# Patient Record
Sex: MALE | Race: ASIAN | NOT HISPANIC OR LATINO | URBAN - METROPOLITAN AREA
[De-identification: names, ages, dates, MRNs, and addresses within clinical notes are randomized per-mention and may not be internally consistent; named-entity substitution may affect disease eponyms.]

---

## 2023-09-14 ENCOUNTER — LAB (OUTPATIENT)
Dept: LAB | Facility: LAB | Age: 28
End: 2023-09-14
Payer: COMMERCIAL

## 2023-09-14 LAB
HEPATITIS C VIRUS AB PRESENCE IN SERUM: NONREACTIVE
HIV 1/ 2 AG/AB SCREEN: NONREACTIVE
SYPHILIS TOTAL AB: NONREACTIVE

## 2023-09-15 LAB
CHLAMYDIA TRACH., AMPLIFIED: NEGATIVE
CHLAMYDIA TRACH., AMPLIFIED: NEGATIVE
N. GONORRHEA, AMPLIFIED: NEGATIVE
N. GONORRHEA, AMPLIFIED: NEGATIVE

## 2023-11-27 PROCEDURE — 87070 CULTURE OTHR SPECIMN AEROBIC: CPT

## 2023-11-27 PROCEDURE — 87186 SC STD MICRODIL/AGAR DIL: CPT

## 2023-11-27 PROCEDURE — 87075 CULTR BACTERIA EXCEPT BLOOD: CPT

## 2023-11-29 ENCOUNTER — LAB REQUISITION (OUTPATIENT)
Dept: LAB | Facility: HOSPITAL | Age: 28
End: 2023-11-29
Payer: COMMERCIAL

## 2023-11-29 DIAGNOSIS — N48.1 BALANITIS: ICD-10-CM

## 2023-12-01 LAB
BACTERIA SPEC CULT: ABNORMAL
BACTERIA SPEC CULT: ABNORMAL
GRAM STN SPEC: ABNORMAL
GRAM STN SPEC: ABNORMAL

## 2024-03-11 NOTE — PROGRESS NOTES
Patient: Chris Francisco    [id number]  : 1995 -- AGE 28 y.o.    Provider: Perri Sharp MD PhD     Location [facility]   Service Date: 3/12/2024              [facility]  Followup Visit Note      HISTORY OF PRESENT ILLNESS     The patient's referring provider is:  Yvette Grijalva MD     HISTORY OF PRESENT ILLNESS   Chris Francisco is a 28 y.o. male with PMHx significant for THIERRY, deviated nasal septum with nasal airway obstruction R>L, nasal congestion, obesity class 2, GERD, gout, depression, who presents to a [facility] for followup.     PAST SLEEP HISTORY  Patient has had past sleep testing in 2022 which showed severe THIERRY with an AHI3% of 66/h. He was started on APAP.     CURRENT HISTORY  On today's visit, the patient reports he is using CPAP every night. On CPAP he does not snore, gasp, or choke. A few nights/month he has issues breathing with CPAP.  He senses his nares collapsing and feels nasal dilators helps. He is using nasal mask right now. He is using OTC fluticasone.    Overall feels more refreshed on CPAP than without it.    Sleep schedule:    In bed: 9PM  Activities in bed:  Bedtime Activities: turns out the lights  Subjective sleep latency:  15min  Awakenings during night: 0-1x  Length of awakenings: < 30 min  Final awakening time: 4:45AM  Out of bed: 4:45AM  Overall estimate of total sleep time: 6-8h    Weekends: similar  Preferred sleeping position: supine and sidelying  Typically sleeps alone.       PAP Adherence:  DURABLE MEDICAL EQUIPMENT COMPANY: MEDICAL [employer] COMPANY  Machine: THERAPY: RESMED AIRSENSE 11 PRESSURE SETTINGS: 5 - 15 cm H2O  Mask: MASK TYPE: NASAL MASK   Issues with therapy: ISSUES WITH THERAPY: dryness/congestion  Benefits with PAP: PERCEIVED BENEFITS OF PAP: refreshing sleep reduced daytime sleepiness      Daytime Symptoms  On awakening patient reports: waking refreshed    Daytime: During  the day, he does not doze unintentionally while inactive.  During more active tasks, he rarely is drowsy.  With regards to daytime napping, the patient reports rarely taking naps. If he takes a nap, typically he awakens refreshed.  He does not report that poor sleep results in mood-related irritability. He does not report that poor sleep results in issues with memory/concentration.    ESS: 2  JOSE: 8  FOSQ: 38    REVIEW OF SYSTEMS     REVIEW OF SYSTEMS  Review of Systems   All other systems reviewed and are negative.    ALLERGIES AND MEDICATIONS     ALLERGIES  No Known Allergies    MEDICATIONS: He has a current medication list which includes the following prescription(s): allopurinol - Take 1 tablet (300 mg) by mouth once daily and bupropion xl - Take 1 tablet (300 mg) by mouth once daily.    PAST MEDICAL HISTORY : He does not have a problem list on file.    PAST SURGICAL HISTORY: He  has no past surgical history on file.     FAMILY HISTORY: Mom has RLS, father has bruxism No changes since previous visit. Otherwise non-contributory as charted.     SOCIAL HISTORY  He  reports that he quit smoking about 5 years ago. His smoking use included cigarettes. He does not have any smokeless tobacco history on file. He reports current alcohol use. He reports that he does not currently use drugs. Student at [employer].       PHYSICAL EXAM     Physical Examination: /80   Pulse 87   Temp 36.6 °C (97.9 °F)   Wt 129 kg (284 lb)   SpO2 98%   BMI 37.47 kg/m²     PREVIOUS WEIGHTS:  Wt Readings from Last 3 Encounters:   03/12/24 129 kg (284 lb)   11/22/22 122 kg (268 lb 7 oz)   10/18/22 123 kg (270 lb 6.4 oz)       General: The patient is a pleasant male, in no acute distress. HEENT: He has a  a modified Mallampati grade 2 airway with Numbers; 0-4 (with +): 3+ tonsils bilaterally. The soft palate was  arched  and the uvula was normal. The A/P diameter of the velopharynx was not narrowed. The oropharynx was not shallow in the A/P  "diameter. Lateral wall narrowing was not present. Tongue ridging was notpresent. Erythema of the posterior pharynx was not present. Mucosal hypertrophy in the posterior oropharynx was not present. Retrognathia was not and micrognathia was not present. Neck: The neck was not enlarged. No JVP, bruits or lymphadenopathy was appreciated. Chest: Clear to auscultation. No wheezes, rales, or rhonchi. Cardiovascular: Regular rate and rhythm. No murmurs, gallops, or clicks. Abdomen: Soft, nontender, nondistended. Positive bowel sounds. Extremities: No clubbing, cyanosis, or edema is noted. Neurologic exam: Alert, oriented x3 and was grossly non-focal.      RESULTS/DATA     No results found for: \"IRON\", \"TRANSFERRIN\", \"IRONSAT\", \"TIBC\", \"FERRITIN\"    Bicarbonate (mmol/L)   Date Value   01/24/2022 26   02/18/2021 28   09/03/2019 30       PAP Adherence  A PAP adherence download was obtained and data was reviewed personally today in clinic. (see scanned document in EPIC)      DIAGNOSES     Problem List and Orders  Diagnoses and all orders for this visit:  THIERRY (obstructive sleep apnea)  -     Positive Airway Pressure (PAP) Therapy        ASSESSMENT/PLAN     Mr. Francisco is a 28 y.o. male and with PMHx significant for THIERRY, deviated nasal septum with nasal airway obstruction R>L, nasal congestion, obesity class 2, GERD, gout, depression. He returns in followup to  the [facility] for their sleep apnea.      THIERRY on PAP: Overall, Mr. Francisco is doing well with the use of PAP therapy for his THIERRY. Compared to before starting PAP, he continues to use and benefit from use of the PAP device and has observed improvements in sleep quality and daytime function. Specifically, his residual daytime sleepiness or fatigue has significantly improved. Thus, continued use of PAP was recommended and to use for the entire sleep period. On PAP, he doesn't snore, have witnessed apneas, gasp for air, or kick/thrash. Objective " "compliance data also suggest excellent efficacy in the home setting. At a minimum he should use PAP for at least 4 hours/night, however, \"all night, every night\" is best. In addition, PAP use during naps are also encouraged. Mr. Francisco was advised to contact him DME to obtain replenishment supplies for him PAP every 3 months or as needed or for other equipment specific issues.      Nasal congestion. Patient with some congestion issues with using PAP despite flonase. Recommend increasing humidification. Also could try nasal washes. Will also have him trial nasal pillows to see if stenting the airway is helpful for him. If not improvement suggest seeing ENT for additional suggestions.    Obesity.  The patient was counseled that his weight is the strongest modifiable risk factor and contributor for THIERRY. He was counseled to consider weight loss options to include changes in dietary habits and activity. We briefly discussed the use of calorie tracking smartphone apps and the use of activity meters to provide feedback that helps in losing weight.  Before initiating an exercise plan, he should carefully review the approach with his primary care provider.           Follow up: 1 year         Perri Sharp MD PhD     "

## 2024-03-12 ENCOUNTER — OFFICE VISIT (OUTPATIENT)
Dept: SLEEP MEDICINE | Facility: HOSPITAL | Age: 29
End: 2024-03-12
Payer: COMMERCIAL

## 2024-03-12 VITALS
TEMPERATURE: 97.9 F | HEART RATE: 87 BPM | WEIGHT: 284 LBS | SYSTOLIC BLOOD PRESSURE: 123 MMHG | BODY MASS INDEX: 37.47 KG/M2 | OXYGEN SATURATION: 98 % | DIASTOLIC BLOOD PRESSURE: 80 MMHG

## 2024-03-12 DIAGNOSIS — G47.33 OSA (OBSTRUCTIVE SLEEP APNEA): Primary | ICD-10-CM

## 2024-03-12 DIAGNOSIS — E66.01 CLASS 2 SEVERE OBESITY DUE TO EXCESS CALORIES WITH SERIOUS COMORBIDITY AND BODY MASS INDEX (BMI) OF 37.0 TO 37.9 IN ADULT (MULTI): ICD-10-CM

## 2024-03-12 PROCEDURE — 99214 OFFICE O/P EST MOD 30 MIN: CPT | Performed by: INTERNAL MEDICINE

## 2024-03-12 RX ORDER — BUPROPION HYDROCHLORIDE 300 MG/1
1 TABLET ORAL DAILY
COMMUNITY
Start: 2022-02-16

## 2024-03-12 RX ORDER — ALLOPURINOL 300 MG/1
1 TABLET ORAL DAILY
COMMUNITY
Start: 2018-07-01

## 2024-03-12 SDOH — ECONOMIC STABILITY: FOOD INSECURITY: WITHIN THE PAST 12 MONTHS, YOU WORRIED THAT YOUR FOOD WOULD RUN OUT BEFORE YOU GOT MONEY TO BUY MORE.: NEVER TRUE

## 2024-03-12 SDOH — ECONOMIC STABILITY: FOOD INSECURITY: WITHIN THE PAST 12 MONTHS, THE FOOD YOU BOUGHT JUST DIDN'T LAST AND YOU DIDN'T HAVE MONEY TO GET MORE.: NEVER TRUE

## 2024-03-12 ASSESSMENT — PATIENT HEALTH QUESTIONNAIRE - PHQ9
SUM OF ALL RESPONSES TO PHQ9 QUESTIONS 1 & 2: 0
2. FEELING DOWN, DEPRESSED OR HOPELESS: NOT AT ALL
1. LITTLE INTEREST OR PLEASURE IN DOING THINGS: NOT AT ALL

## 2024-03-12 ASSESSMENT — LIFESTYLE VARIABLES
HOW OFTEN DO YOU HAVE A DRINK CONTAINING ALCOHOL: MONTHLY OR LESS
HOW OFTEN DO YOU HAVE SIX OR MORE DRINKS ON ONE OCCASION: NEVER

## 2024-03-12 ASSESSMENT — PAIN SCALES - GENERAL: PAINLEVEL: 0-NO PAIN

## 2024-03-12 NOTE — PATIENT INSTRUCTIONS
[facility] Sleep Medicine  [facility] [place]  [address]  [facility] [address]  [phone]    [facility] [place]  [address]  [facility] [address]  [phone]  [facility] [place]  [address]  [facility]  [facility] [address]           NAME: Chris Francisco   DATE: 3/12/2024     Your Sleep Provider Today: Perri Sharp MD PhD  Your Primary Care Physician: Yvette Grijalva MD   Your Referring Provider: No ref. provider found    Thank you for coming to the Sleep Medicine Clinic today! Your sleep medicine provider today was: Perri Sharp MD PhD Below is a summary of your treatment plan, other important information, and our contact numbers:  If you need to schedule an appointment, please call [phone]  If you need general assistance (e.g. forms completed, general questions), please call my , Valerie, at [phone].  If you have a medical question about your sleep issues, please contact our nurses, Cherise or Effie at [phone].   You can also contact us through [employer].      DIAGNOSIS:   1. THIERRY (obstructive sleep apnea)  Positive Airway Pressure (PAP) Therapy              TREATMENT PLAN     Try increasing the humidifcation to see if this improves your nasal breathing  We will have you also try the nasal pillows  If this does not help, try nasal washes and if that doesn't can add azelastine  If not improving then schedule folowup with Dr. Huerta    Instructions - Common THIERRY Recs: - For your sleep apnea, continue to use your PAP every night and use it whenever you are sleeping.   - Avoid alcohol or sedatives several hours prior to sleeping.   - Get additional supplies for your PAP (e.g., mask, hose, filters) every 3 months or as your insurance allows from your [employer] company. Replacement cushions for your PAP mask can be requested  monthly if airseals are an issue.  - Remember to clean your mask, tubings, and water chamber regularly as instructed.  - Avoid driving or operating heavy machinery when drowsy. A person driving while sleepy is five (5) times more likely to have an accident. If you feel sleepy, pull over and take a short power nap (sleep for less than 30 minutes). Otherwise, ask somebody to drive you.    Follow-up Appointment:   Followup with me in 12 months.      IMPORTANT INFORMATION     Call 911 for medical emergencies.  Our offices are generally open from Monday-Friday, 9 am - 5 pm.  If you need to get in touch with me, you may either call me and my team(number is below) or you can use [employer].  If a referral for a test, for CPAP, or for another specialist was made, and you have not heard about scheduling this within a week, please call scheduling at [phone] ([phone]).  If you are unable to make your appointment for clinic or an overnight study, kindly call the office at least 48 hours in advance to cancel and reschedule.  If you are on CPAP, please bring your device's card or the device to each clinic appointment.   There are no supporting services by either the sleep doctors or their staff on weekends and Holidays, or after 5 PM on weekdays.   If you have been asked to come to a sleep study, make sure you bring toiletries, a comfy pillow, and any nighttime medications that you may regularly take. Also be sure to eat dinner before you arrive. We generally do not provide meals.      PRESCRIPTIONS     We require 7 days advanced notice for prescription refills. If we do not receive the request in this time, we cannot guarantee that your medication will be refilled in time.      IMPORTANT PHONE NUMBERS      scheduling for medical [phone]   Sleep Medicine Clinic Fax: [phone]  Appointments (for Pediatric Sleep Clinic): [phone] - option 1  Appointments (for Adult Sleep Clinic):  [phone] - option 2  Appointments (For Sleep Studies): [phone] - option 3  Behavioral Sleep Medicine: [phone]  Bariatric Surgery: [phone] ( Bariatric Surgery Website)   Sleep Surgery: [phone]  ENT (Otolaryngology): [phone]  Headache Clinic (Neurology): [phone]  Neurology: [phone]  Psychiatry: [phone]  Pulmonary Function Testing (PFT) Center: [phone]  Pulmonary Medicine: [phone]  [place] (DME): [phone]  [place] (DME): [phone]  [facility] (DME): [phone]      COMMON PROVIDERS WE REFER TO     For Weight Loss - Dr. Kathi Abarca - Call [phone]  For Sleep Surgery - Dr. Myriam Hernandez - Call [phone]      OUR ADULT SLEEP MEDICINE TEAM   Please do not hesitate to call the office or sleep nurse with any questions between appointments:    Adult Sleep Nurses (Effie Metz, RN and Cherise Carrion RN):  For clinical questions and refilling prescriptions: [phone]  Email sleep diaries and other documents at: [email or web address]    Adult Sleep Medicine Secretaries:  Tati Xie (For Cesar/Hong/Krise/Strohl/Yeh/Trejo):   P: [phone]  F: [phone]  Valerie Higgins (For Sharp/Guggenbiller): P: [phone]  Fax: [phone]  Argelia Weber (For Jurcevic/Blank): P: [phone]  F: [phone]  Mariella Solorzano (For Christopher): P: [phone]  F: [phone]  Subha Harley (For Pooja/Lidia/Zakhary): P: [phone]  F: [phone]  Lilliam Engel (For Rodgers/Callejas): P: [phone]  F: [phone]     Adult Sleep Medicine Advanced Practice Providers:  Obi Beltre ([place], [place])  Candelaria Murillo ([facility])  Sandra Grissom CNP ([place], [place], [facility])  Negar Goldman CNP ([place], [place], [facility])  Ileana White ([place], [place], [facility])  Tyesha Callejas CNP ([facility])        OUR SLEEP TESTING LOCATIONS  "    Our team will contact you to schedule your sleep study, however, you can contact us as follow:  Main Phone Line (scheduling only): [phone]), option 3  Adult and Pediatric Locations   [place] (6 years and older): [facility] - 4th floor ([address] [facility], [facility]) After hours line: [phone]  [facility] (Main campus: All ages): [facility], 6th floor. After hours line: [phone]   [place] (5 years and older; younger considered on case-by-case basis): [address]; [address], [address]. Scheduling  After hours line: [phone]   [place] (6 years and older): [address]; [address]; [address]   [place] (6 years and older): [address] [facility], [address]  After hours line: [phone]   [place] (13 years and older) in [place]: [address], 2nd floor  After hours line: [phone]   [place] (13 year and older): [address]  After hours line: [phone]     Adult Only Locations:   [place] (18 years and older): [address] [facility], 2nd floor   [place] (18 years and older): [address] [facility]; 4th floor  After hours line: [phone]   [place] (18 years and older) at [place]: [address] [place] [address]  After hours line: [phone]          CONTACTING YOUR SLEEP MEDICINE PROVIDER     Send a message directly to your provider through \"My Chart\", which is the email service through your  Records Account: [email or web address]   Call [phone] and leave a message. One of the administrative assistants will forward the message to your sleep medicine provider through \"My Chart\" and/or email.     Your sleep medicine provider for this visit was: Perri Sharp MD PhD        "

## 2024-08-12 ENCOUNTER — LAB REQUISITION (OUTPATIENT)
Dept: LAB | Facility: HOSPITAL | Age: 29
End: 2024-08-12
Payer: COMMERCIAL

## 2024-08-12 DIAGNOSIS — Z11.3 ENCOUNTER FOR SCREENING FOR INFECTIONS WITH A PREDOMINANTLY SEXUAL MODE OF TRANSMISSION: ICD-10-CM

## 2024-08-12 PROCEDURE — 87591 N.GONORRHOEAE DNA AMP PROB: CPT

## 2024-08-12 PROCEDURE — 87491 CHLMYD TRACH DNA AMP PROBE: CPT

## 2024-08-13 ENCOUNTER — LAB (OUTPATIENT)
Dept: LAB | Facility: LAB | Age: 29
End: 2024-08-13
Payer: COMMERCIAL

## 2024-08-13 DIAGNOSIS — Z11.3 ENCOUNTER FOR SCREENING FOR INFECTIONS WITH A PREDOMINANTLY SEXUAL MODE OF TRANSMISSION: Primary | ICD-10-CM

## 2024-08-13 LAB
ALBUMIN SERPL BCP-MCNC: 4.3 G/DL (ref 3.4–5)
ALP SERPL-CCNC: 54 U/L (ref 33–120)
ALT SERPL W P-5'-P-CCNC: 53 U/L (ref 10–52)
ANION GAP SERPL CALC-SCNC: 11 MMOL/L (ref 10–20)
AST SERPL W P-5'-P-CCNC: 22 U/L (ref 9–39)
BASOPHILS # BLD AUTO: 0.05 X10*3/UL (ref 0–0.1)
BASOPHILS NFR BLD AUTO: 0.5 %
BILIRUB SERPL-MCNC: 0.5 MG/DL (ref 0–1.2)
BUN SERPL-MCNC: 14 MG/DL (ref 6–23)
C TRACH RRNA SPEC QL NAA+PROBE: NEGATIVE
CALCIUM SERPL-MCNC: 9 MG/DL (ref 8.6–10.6)
CHLORIDE SERPL-SCNC: 101 MMOL/L (ref 98–107)
CHOLEST SERPL-MCNC: 183 MG/DL (ref 0–199)
CHOLESTEROL/HDL RATIO: 5.1
CO2 SERPL-SCNC: 31 MMOL/L (ref 21–32)
CREAT SERPL-MCNC: 1.02 MG/DL (ref 0.5–1.3)
EGFRCR SERPLBLD CKD-EPI 2021: >90 ML/MIN/1.73M*2
EOSINOPHIL # BLD AUTO: 0.2 X10*3/UL (ref 0–0.7)
EOSINOPHIL NFR BLD AUTO: 2.1 %
ERYTHROCYTE [DISTWIDTH] IN BLOOD BY AUTOMATED COUNT: 13 % (ref 11.5–14.5)
EST. AVERAGE GLUCOSE BLD GHB EST-MCNC: 114 MG/DL
GLUCOSE SERPL-MCNC: 102 MG/DL (ref 74–99)
HBA1C MFR BLD: 5.6 %
HCT VFR BLD AUTO: 43.2 % (ref 41–52)
HDLC SERPL-MCNC: 35.6 MG/DL
HGB BLD-MCNC: 14.1 G/DL (ref 13.5–17.5)
HIV 1+2 AB+HIV1 P24 AG SERPL QL IA: NONREACTIVE
IMM GRANULOCYTES # BLD AUTO: 0.04 X10*3/UL (ref 0–0.7)
IMM GRANULOCYTES NFR BLD AUTO: 0.4 % (ref 0–0.9)
LDLC SERPL CALC-MCNC: 94 MG/DL
LYMPHOCYTES # BLD AUTO: 3.28 X10*3/UL (ref 1.2–4.8)
LYMPHOCYTES NFR BLD AUTO: 34.7 %
MCH RBC QN AUTO: 28 PG (ref 26–34)
MCHC RBC AUTO-ENTMCNC: 32.6 G/DL (ref 32–36)
MCV RBC AUTO: 86 FL (ref 80–100)
MONOCYTES # BLD AUTO: 0.76 X10*3/UL (ref 0.1–1)
MONOCYTES NFR BLD AUTO: 8.1 %
N GONORRHOEA DNA SPEC QL PROBE+SIG AMP: NEGATIVE
NEUTROPHILS # BLD AUTO: 5.11 X10*3/UL (ref 1.2–7.7)
NEUTROPHILS NFR BLD AUTO: 54.2 %
NON HDL CHOLESTEROL: 147 MG/DL (ref 0–149)
NRBC BLD-RTO: 0 /100 WBCS (ref 0–0)
PLATELET # BLD AUTO: 350 X10*3/UL (ref 150–450)
POTASSIUM SERPL-SCNC: 4.1 MMOL/L (ref 3.5–5.3)
PROT SERPL-MCNC: 7.1 G/DL (ref 6.4–8.2)
RBC # BLD AUTO: 5.04 X10*6/UL (ref 4.5–5.9)
SODIUM SERPL-SCNC: 139 MMOL/L (ref 136–145)
TREPONEMA PALLIDUM IGG+IGM AB [PRESENCE] IN SERUM OR PLASMA BY IMMUNOASSAY: NONREACTIVE
TRIGL SERPL-MCNC: 266 MG/DL (ref 0–149)
URATE SERPL-MCNC: 6.2 MG/DL (ref 4–7.5)
VLDL: 53 MG/DL (ref 0–40)
WBC # BLD AUTO: 9.4 X10*3/UL (ref 4.4–11.3)

## 2024-08-13 PROCEDURE — 80061 LIPID PANEL: CPT

## 2024-08-13 PROCEDURE — 87389 HIV-1 AG W/HIV-1&-2 AB AG IA: CPT

## 2024-08-13 PROCEDURE — 36415 COLL VENOUS BLD VENIPUNCTURE: CPT

## 2024-08-13 PROCEDURE — 83036 HEMOGLOBIN GLYCOSYLATED A1C: CPT

## 2024-08-13 PROCEDURE — 84550 ASSAY OF BLOOD/URIC ACID: CPT

## 2024-08-13 PROCEDURE — 85025 COMPLETE CBC W/AUTO DIFF WBC: CPT

## 2024-08-13 PROCEDURE — 86780 TREPONEMA PALLIDUM: CPT

## 2024-08-13 PROCEDURE — 80053 COMPREHEN METABOLIC PANEL: CPT

## 2024-08-29 ENCOUNTER — HOSPITAL ENCOUNTER (OUTPATIENT)
Dept: RADIOLOGY | Facility: HOSPITAL | Age: 29
Discharge: HOME | End: 2024-08-29
Payer: COMMERCIAL

## 2024-08-29 DIAGNOSIS — Z11.1 ENCOUNTER FOR SCREENING FOR RESPIRATORY TUBERCULOSIS: ICD-10-CM

## 2024-08-29 PROCEDURE — 71046 X-RAY EXAM CHEST 2 VIEWS: CPT

## 2024-08-29 PROCEDURE — 88305 TISSUE EXAM BY PATHOLOGIST: CPT

## 2024-08-29 PROCEDURE — 71046 X-RAY EXAM CHEST 2 VIEWS: CPT | Performed by: RADIOLOGY

## 2024-09-03 ENCOUNTER — LAB REQUISITION (OUTPATIENT)
Dept: DERMATOPATHOLOGY | Facility: CLINIC | Age: 29
End: 2024-09-03
Payer: COMMERCIAL

## 2024-09-03 DIAGNOSIS — D48.5 NEOPLASM OF UNCERTAIN BEHAVIOR OF SKIN: ICD-10-CM

## 2024-09-04 LAB
LABORATORY COMMENT REPORT: NORMAL
PATH REPORT.FINAL DX SPEC: NORMAL
PATH REPORT.GROSS SPEC: NORMAL
PATH REPORT.MICROSCOPIC SPEC OTHER STN: NORMAL
PATH REPORT.RELEVANT HX SPEC: NORMAL
PATH REPORT.TOTAL CANCER: NORMAL

## 2025-01-25 NOTE — PROGRESS NOTES
Sinus & Skull Base Surgery    Chief Complaint:  1. Post nasal drip  2. Nasal airway obstruction, R>L -- improved with Azelastine   3. Facial pain, pressure; headaches  4. Throat clearing  5. Nasal bleeding -- improved after stopping fluticasone  6. Deviated nasal septum  7. THIERRY    History Of Present Illness:  Chris Francisco presents since last being seen 10/18/2022    Main Symptoms:  Patient {ACTIONS; HAS/DOES NOT HAVE:15065} anterior nasal drainage.  Patient {ACTIONS; HAS/DOES NOT HAVE:92552} posterior nasal drainage.  Patient {ACTIONS; HAS/DOES NOT HAVE:26468} nasal airway obstruction.  Patient {ACTIONS; HAS/DOES NOT HAVE:15039} facial pain.  Patient {ACTIONS; HAS/DOES NOT HAVE:75169} facial pressure.  Patient {ACTIONS; HAS/DOES NOT HAVE:74822} decreased sense of smell. Decreased *** % of normal.  Associated Symptoms:  Patient {ACTIONS; HAS/DOES NOT HAVE:36508} headaches.  Patient {ACTIONS; HAS/DOES NOT HAVE:05902} throat clearing.  Patient {ACTIONS; HAS/DOES NOT HAVE:73169} coughing.  Patient {ACTIONS; HAS/DOES NOT HAVE:57020} dysphonia.  Patient {ACTIONS; HAS/DOES NOT HAVE:88335} sneezing.  Patient {ACTIONS; HAS/DOES NOT HAVE:76109} itchy eyes.  Patient {ACTIONS; HAS/DOES NOT HAVE:04521} nasal bleeding.    Medications currently on for sinonasal symptoms:  ***    Medications tried in the past for sinonasal symptoms:  fluticasone (stopped because of nose bleeds).    Active Problems:  Patient Active Problem List   Diagnosis    Encounter for screening for infections with a predominantly sexual mode of transmission     Past Medical History:  He has a past medical history of Gout, unspecified, Personal history of other diseases of the respiratory system, and Personal history of tuberculosis.    Surgical History:  He has no past surgical history on file.    Family History:  No family history on file.    Social History:  He reports that he quit smoking about 6 years ago. His smoking use included cigarettes. He  does not have any smokeless tobacco history on file. He reports current alcohol use. He reports that he does not currently use drugs.    Allergies:  Patient has no known allergies.    Current Meds:  Current Outpatient Medications:     allopurinol (Zyloprim) 300 mg tablet, Take 1 tablet (300 mg) by mouth once daily., Disp: , Rfl:     buPROPion XL (Wellbutrin XL) 300 mg 24 hr tablet, Take 1 tablet (300 mg) by mouth once daily., Disp: , Rfl:     Vitals:  Visit Vitals  Smoking Status Former     Physical Exam:  Nose: On external exam there are neither lesions nor asymmetry of the nasal tip/dorsum. On anterior rhinoscopy, visualization posteriorly is limited on anterior examination. For this reason, to adequately evaluate posteriorly for masses, source of epistaxis, polypoid disease, debridement, and/or signs of infections, nasal endoscopy is indicated.  (Please see procedure below.)    SINONASAL ENDOSCOPY (CPT 88851): To better evaluate the patient's symptoms, sinonasal endoscopy is indicated.  After discussion of risks and benefits, and topical decongestion and anesthesia, an endoscope was used to perform nasal endoscopy on each side.  A time out identifying the patient, the procedure, the location of the procedure and any concerns was performed prior to beginning the procedure.    Findings:      Results/Data:  ***    Provider Impressions:  1. Post nasal drip  2. Nasal airway obstruction, R>L -- improved with Azelastine   3. Facial pain, pressure; headaches  4. Throat clearing  5. Nasal bleeding -- improved after stopping fluticasone  6. Deviated nasal septum  7. THIERRY    Discussion:  Chris Francisco         He was amenable to this, and all questions were answered.    Scribe Attestation  By signing my name below, IRonal, Mendozaibjessenia, attest that this documentation has been prepared under the direction and in the presence of Alfonso Huerta MD.    Signature:  Alfonso Huerta MD   procedure, the location of the procedure and any concerns was performed prior to beginning the procedure.    Findings:  Examination of the right nasal cavity revealed pus at the middle meatus and edema at the sphenoethmoid recess. The septum was deviated to the right and had a dorsal component.  Examination of the left nasal cavity revealed a normal appearing middle meatus and sphenoethmoid recess. There was pus in the nasopharynx.  He had bilateral inferior turbinate hypertrophy.    Results/Data:  I personally reviewed the sleep medicine note from Dr. Perri Sharp dated March 12, 2024. He was reevaluated at that visit and was asked to see ENT for congestion issues.    I personally reviewed the sleep medicine note from Dr. Perri Sharp dated November 22, 2022. He was ordered a CPAP at that time.    Provider Impressions:  1.  Rhinorrhea  2.  Nasal airway obstruction; deviated nasal septum (dorsal component); bilateral inferior turbinate hypertrophy  3.  Facial pain and pressure; headaches -- when ill  4.  Decreased sense of smell  5.  Throat clearing, dysphonia  6.  Obstructive sleep apnea on positive pressure    Discussion:  Chris Francisco and I discussed his exam and symptoms.  I recommended a 10-day course of Augmentin as he was infected today on the right hand side.  I asked him to discontinue the antibiotic for any side effects.    I also recommended that he obtain a noncontrast CT sinus.  This will allow us to understand the overall condition of his sinuses after completion of the antibiotic therapy.    If he would like to consider surgical intervention for his nasal airway, I would recommend assessment with one of my facial plastic surgery partners given the dorsal component.  If he has sinus disease on the CT, we could consider a joint procedure with Dr. Frankel performing the nasal airway component and I would perform the sinus component.    He has not previously been assessed for allergies and this is  certainly another direction that we could go into.    I asked him to follow-up with me virtually after completion of the CT sinus so that we can formulate a plan moving forward.  All questions were answered.    Scribe Attestation  By signing my name below, I, Marva Alvarez, attest that this documentation has been prepared under the direction and in the presence of Alfonso Huerta MD.    Signature:  Alfonso Huerta MD

## 2025-01-27 ENCOUNTER — APPOINTMENT (OUTPATIENT)
Dept: OTOLARYNGOLOGY | Facility: CLINIC | Age: 30
End: 2025-01-27
Payer: COMMERCIAL

## 2025-01-27 VITALS — BODY MASS INDEX: 34.33 KG/M2 | WEIGHT: 259 LBS | HEIGHT: 73 IN

## 2025-01-27 DIAGNOSIS — J01.41 ACUTE RECURRENT PANSINUSITIS: Primary | ICD-10-CM

## 2025-01-27 DIAGNOSIS — R43.8 DECREASED SENSE OF SMELL: ICD-10-CM

## 2025-01-27 DIAGNOSIS — R09.81 NASAL CONGESTION: ICD-10-CM

## 2025-01-27 DIAGNOSIS — J34.2 DEVIATED SEPTUM: ICD-10-CM

## 2025-01-27 PROCEDURE — 31231 NASAL ENDOSCOPY DX: CPT | Performed by: OTOLARYNGOLOGY

## 2025-01-27 PROCEDURE — 3008F BODY MASS INDEX DOCD: CPT | Performed by: OTOLARYNGOLOGY

## 2025-01-27 PROCEDURE — 99214 OFFICE O/P EST MOD 30 MIN: CPT | Performed by: OTOLARYNGOLOGY

## 2025-01-27 RX ORDER — AMOXICILLIN AND CLAVULANATE POTASSIUM 875; 125 MG/1; MG/1
875 TABLET, FILM COATED ORAL 2 TIMES DAILY
Qty: 20 TABLET | Refills: 0 | Status: SHIPPED | OUTPATIENT
Start: 2025-01-27 | End: 2025-02-06

## 2025-01-27 RX ORDER — CETIRIZINE HYDROCHLORIDE 5 MG/1
5 TABLET ORAL DAILY
COMMUNITY

## 2025-01-27 ASSESSMENT — PATIENT HEALTH QUESTIONNAIRE - PHQ9
2. FEELING DOWN, DEPRESSED OR HOPELESS: NOT AT ALL
SUM OF ALL RESPONSES TO PHQ9 QUESTIONS 1 AND 2: 0
1. LITTLE INTEREST OR PLEASURE IN DOING THINGS: NOT AT ALL

## 2025-02-11 ENCOUNTER — HOSPITAL ENCOUNTER (OUTPATIENT)
Dept: RADIOLOGY | Facility: CLINIC | Age: 30
Discharge: HOME | End: 2025-02-11
Payer: COMMERCIAL

## 2025-02-11 ENCOUNTER — TELEPHONE (OUTPATIENT)
Dept: OTOLARYNGOLOGY | Facility: CLINIC | Age: 30
End: 2025-02-11
Payer: COMMERCIAL

## 2025-02-11 ENCOUNTER — TELEPHONE (OUTPATIENT)
Facility: CLINIC | Age: 30
End: 2025-02-11
Payer: COMMERCIAL

## 2025-02-11 DIAGNOSIS — R09.81 NASAL CONGESTION: ICD-10-CM

## 2025-02-11 DIAGNOSIS — J01.41 ACUTE RECURRENT PANSINUSITIS: ICD-10-CM

## 2025-02-11 PROCEDURE — 70486 CT MAXILLOFACIAL W/O DYE: CPT

## 2025-02-11 NOTE — TELEPHONE ENCOUNTER
[facility]- Calling patient to set him up with an appointment to come see Dr. Frankel for a surgery consult with maxillary sinus involvement. Referral from Dr. Huerta.

## 2025-02-11 NOTE — TELEPHONE ENCOUNTER
Per Dr. Huerta, patient notified that Dr. Huerta reviewed his CT Sinus that he had completed today. It is Dr. Huerta's recommendation that the patient schedule an appointment to see Dr. Frankel for a surgical consult. Patient was notified that Dr. Huerta and Dr. Frankel discussed that his maxillary sinus disease can be addressed by Dr. Frankel in a solo surgery. Patient verbalizes understanding and agrees with plan of care. Patient will follow up with Dr. Frankel's office to schedule an appointment. He elected to keep his VV with Dr. Huerta for March 10, 2025 as well.

## 2025-02-17 ENCOUNTER — TELEPHONE (OUTPATIENT)
Facility: CLINIC | Age: 30
End: 2025-02-17
Payer: COMMERCIAL

## 2025-02-17 NOTE — TELEPHONE ENCOUNTER
Spoke to patient- trying to get him scheduled to see Dr. Frankel for solo surgery; referred by Dr. Huerta. Patient stated he decided not to schedule with Dr. Frankel. I made Dr. Huerta's office aware.

## 2025-03-10 ENCOUNTER — APPOINTMENT (OUTPATIENT)
Dept: OTOLARYNGOLOGY | Facility: CLINIC | Age: 30
End: 2025-03-10
Payer: COMMERCIAL